# Patient Record
Sex: FEMALE | URBAN - METROPOLITAN AREA
[De-identification: names, ages, dates, MRNs, and addresses within clinical notes are randomized per-mention and may not be internally consistent; named-entity substitution may affect disease eponyms.]

---

## 2021-08-19 ENCOUNTER — PRE-OPERATIVE VISIT (OUTPATIENT)
Dept: URBAN - METROPOLITAN AREA CLINIC 4 | Facility: CLINIC | Age: 41
End: 2021-08-19

## 2021-08-19 DIAGNOSIS — H52.13 MYOPIA, BILATERAL: Primary | ICD-10-CM

## 2021-08-19 DIAGNOSIS — H53.8 OTHER VISUAL DISTURBANCES: ICD-10-CM

## 2021-08-19 PROCEDURE — 92015 DETERMINE REFRACTIVE STATE: CPT | Performed by: OPHTHALMOLOGY

## 2021-08-19 ASSESSMENT — INTRAOCULAR PRESSURE
OD: 19
OS: 18

## 2021-08-19 ASSESSMENT — KERATOMETRY
OS: 44.63
OD: 44.63

## 2021-08-19 ASSESSMENT — VISUAL ACUITY
OD: 20/20
OS: 20/20

## 2021-08-19 NOTE — IMPRESSION/PLAN
Impression: Other visual disturbances: H53.8. Plan: Laser Vision Correction Evaluation / Myopia / Regular Astigmatism OU- patient with reasonable expectations and no contraindications for laser vision correction based on history and exam. Recommend LASIK to decrease the dependence on glasses or contact lenses. Discussed Presbyopia and the need for reading glasses in the future. Discussed Monovision as a surgical option to decrease the dependence on reading glasses after Presbyopia develops, patient not interested. R/B/A's of LASIK discussed, the patient desires to proceed, informed consent obtained.

## 2021-08-21 ENCOUNTER — POST-OPERATIVE VISIT (OUTPATIENT)
Dept: URBAN - METROPOLITAN AREA CLINIC 4 | Facility: CLINIC | Age: 41
End: 2021-08-21

## 2021-08-21 PROCEDURE — 99024 POSTOP FOLLOW-UP VISIT: CPT | Performed by: OPHTHALMOLOGY

## 2021-08-21 NOTE — IMPRESSION/PLAN
Impression: S/P LASIK - Customvue OU - 1 Day. Encounter for surgical aftercare following surgery on a sense organ  Z48.810. Plan: POD#1 s/p LASIK both eyes- healing well, flap intact. Continue Pred-Moxi eye drops QID until next visit. Cont. Vitamin C 1,000mg PO QD, sunglasses protection and preservative free artificial tears A43-14hal while awake until next visit. Patient instructed to continue precautions of not rubbing the eyes, to sleep with protective goggles until the next visit. F/U in 1 week, sooner PRN.

## 2021-08-25 ENCOUNTER — POST-OPERATIVE VISIT (OUTPATIENT)
Dept: URBAN - METROPOLITAN AREA CLINIC 4 | Facility: CLINIC | Age: 41
End: 2021-08-25

## 2021-08-25 PROCEDURE — 99024 POSTOP FOLLOW-UP VISIT: CPT

## 2021-08-25 ASSESSMENT — KERATOMETRY
OD: 36.50
OS: 36.75

## 2021-08-25 NOTE — IMPRESSION/PLAN
Impression: S/P LASIK - Customvue OU - 5 Days. Encounter for surgical aftercare following surgery on a sense organ  Z48.810. Plan: POW#1 s/p LASIK both eyes - healing well, flap intact. Cont. Pred-Moxi QD until runs out. Continue Vitamin C 1,000mg PO QD, sunglasses protection and preservative free artificial tears but decrease to every 1 hour while awake and plan to taper one hour per week until next visit. Stop sleeping with goggles at night and can start to gently rub the eyes. F/U in 3-4 weeks, sooner PRN.

## 2021-09-27 ENCOUNTER — POST-OPERATIVE VISIT (OUTPATIENT)
Dept: URBAN - METROPOLITAN AREA CLINIC 4 | Facility: CLINIC | Age: 41
End: 2021-09-27

## 2021-09-27 DIAGNOSIS — Z48.810 ENCOUNTER FOR SURGICAL AFTERCARE FOLLOWING SURGERY ON A SENSE ORGAN: Primary | ICD-10-CM

## 2021-09-27 PROCEDURE — 99024 POSTOP FOLLOW-UP VISIT: CPT

## 2021-09-27 ASSESSMENT — INTRAOCULAR PRESSURE
OD: 11
OS: 11

## 2021-09-27 ASSESSMENT — KERATOMETRY
OS: 37.88
OD: 37.50

## 2021-09-27 NOTE — IMPRESSION/PLAN
Impression: S/P LASIK - Customvue OU - 38 Days. Encounter for surgical aftercare following surgery on a sense organ  Z48.810. Excellent post op course Plan: POM#1 s/p LASIK both eyes - well healed, off Pred-Moxi, patient happy with results. Stop Vitamin C. Cont. sunglasses protection and preservative free artificial tears QID prn. F/U at the anniversary of the surgery for a repeat dilated exam and final PO visit.

## 2022-07-25 ENCOUNTER — POST-OPERATIVE VISIT (OUTPATIENT)
Dept: URBAN - METROPOLITAN AREA CLINIC 4 | Facility: CLINIC | Age: 42
End: 2022-07-25

## 2022-07-25 DIAGNOSIS — Z48.810 ENCOUNTER FOR SURGICAL AFTERCARE FOLLOWING SURGERY ON A SENSE ORGAN: Primary | ICD-10-CM

## 2022-07-25 PROCEDURE — 99024 POSTOP FOLLOW-UP VISIT: CPT | Performed by: OPHTHALMOLOGY

## 2022-07-25 RX ORDER — LIFITEGRAST 50 MG/ML
5 % SOLUTION/ DROPS OPHTHALMIC
Qty: 180 | Refills: 0 | Status: ACTIVE
Start: 2022-07-25

## 2022-07-25 NOTE — IMPRESSION/PLAN
Impression: S/P LASIK - Standard OU - 339 Days. Encounter for surgical aftercare following surgery on a sense organ  Z48.810. Plan: POY#1 s/p LASIK both eyes - well healed, patient happy with results. Cont. Xiidra BID and AT's prn. Cont. sunglasses protection.  F/U yearly for a repeat dilated exam.